# Patient Record
Sex: FEMALE | Race: WHITE | NOT HISPANIC OR LATINO | Employment: UNEMPLOYED | URBAN - METROPOLITAN AREA
[De-identification: names, ages, dates, MRNs, and addresses within clinical notes are randomized per-mention and may not be internally consistent; named-entity substitution may affect disease eponyms.]

---

## 2023-08-15 DIAGNOSIS — E78.5 HYPERLIPIDEMIA, UNSPECIFIED HYPERLIPIDEMIA TYPE: Primary | ICD-10-CM

## 2023-08-15 RX ORDER — EZETIMIBE 10 MG/1
TABLET ORAL
COMMUNITY
Start: 2023-04-11 | End: 2023-08-15 | Stop reason: SDUPTHER

## 2023-08-15 RX ORDER — ASCORBIC ACID 1000 MG
TABLET, EXTENDED RELEASE ORAL EVERY 24 HOURS
COMMUNITY

## 2023-08-15 RX ORDER — EZETIMIBE 10 MG/1
10 TABLET ORAL ONCE
Qty: 90 TABLET | Refills: 1 | Status: SHIPPED | OUTPATIENT
Start: 2023-08-15 | End: 2023-08-17 | Stop reason: SDUPTHER

## 2023-08-15 RX ORDER — HYDROGEN PEROXIDE 3 %
1 SOLUTION, NON-ORAL MISCELLANEOUS
COMMUNITY
Start: 2022-02-28 | End: 2023-09-28

## 2023-08-15 RX ORDER — VIT C/E/ZN/COPPR/LUTEIN/ZEAXAN 250MG-90MG
1 CAPSULE ORAL EVERY 24 HOURS
COMMUNITY

## 2023-08-17 DIAGNOSIS — E78.5 HYPERLIPIDEMIA, UNSPECIFIED HYPERLIPIDEMIA TYPE: ICD-10-CM

## 2023-08-17 RX ORDER — EZETIMIBE 10 MG/1
10 TABLET ORAL ONCE
Qty: 90 TABLET | Refills: 1 | Status: SHIPPED | OUTPATIENT
Start: 2023-08-17 | End: 2023-09-28 | Stop reason: SDUPTHER

## 2023-09-01 LAB
ALANINE AMINOTRANSFERASE (SGPT) (U/L) IN SER/PLAS: 17 U/L (ref 7–45)
ALBUMIN (G/DL) IN SER/PLAS: 4.5 G/DL (ref 3.4–5)
ALKALINE PHOSPHATASE (U/L) IN SER/PLAS: 88 U/L (ref 33–136)
ANION GAP IN SER/PLAS: 14 MMOL/L (ref 10–20)
ASPARTATE AMINOTRANSFERASE (SGOT) (U/L) IN SER/PLAS: 18 U/L (ref 9–39)
BILIRUBIN TOTAL (MG/DL) IN SER/PLAS: 0.7 MG/DL (ref 0–1.2)
CALCIUM (MG/DL) IN SER/PLAS: 9.9 MG/DL (ref 8.6–10.6)
CARBON DIOXIDE, TOTAL (MMOL/L) IN SER/PLAS: 29 MMOL/L (ref 21–32)
CHLORIDE (MMOL/L) IN SER/PLAS: 106 MMOL/L (ref 98–107)
CHOLESTEROL (MG/DL) IN SER/PLAS: 188 MG/DL (ref 0–199)
CHOLESTEROL IN HDL (MG/DL) IN SER/PLAS: 59.9 MG/DL
CHOLESTEROL/HDL RATIO: 3.1
CREATININE (MG/DL) IN SER/PLAS: 0.83 MG/DL (ref 0.5–1.05)
ERYTHROCYTE DISTRIBUTION WIDTH (RATIO) BY AUTOMATED COUNT: 13.6 % (ref 11.5–14.5)
ERYTHROCYTE MEAN CORPUSCULAR HEMOGLOBIN CONCENTRATION (G/DL) BY AUTOMATED: 33.6 G/DL (ref 32–36)
ERYTHROCYTE MEAN CORPUSCULAR VOLUME (FL) BY AUTOMATED COUNT: 88 FL (ref 80–100)
ERYTHROCYTES (10*6/UL) IN BLOOD BY AUTOMATED COUNT: 5.09 X10E12/L (ref 4–5.2)
GFR FEMALE: 76 ML/MIN/1.73M2
GLUCOSE (MG/DL) IN SER/PLAS: 76 MG/DL (ref 74–99)
HEMATOCRIT (%) IN BLOOD BY AUTOMATED COUNT: 44.9 % (ref 36–46)
HEMOGLOBIN (G/DL) IN BLOOD: 15.1 G/DL (ref 12–16)
LDL: 114 MG/DL (ref 0–99)
LEUKOCYTES (10*3/UL) IN BLOOD BY AUTOMATED COUNT: 5.5 X10E9/L (ref 4.4–11.3)
NRBC (PER 100 WBCS) BY AUTOMATED COUNT: 0 /100 WBC (ref 0–0)
PLATELETS (10*3/UL) IN BLOOD AUTOMATED COUNT: 229 X10E9/L (ref 150–450)
POTASSIUM (MMOL/L) IN SER/PLAS: 4.9 MMOL/L (ref 3.5–5.3)
PROTEIN TOTAL: 6.8 G/DL (ref 6.4–8.2)
SODIUM (MMOL/L) IN SER/PLAS: 144 MMOL/L (ref 136–145)
TRIGLYCERIDE (MG/DL) IN SER/PLAS: 70 MG/DL (ref 0–149)
UREA NITROGEN (MG/DL) IN SER/PLAS: 17 MG/DL (ref 6–23)
VLDL: 14 MG/DL (ref 0–40)

## 2023-09-02 LAB — THYROTROPIN (MIU/L) IN SER/PLAS BY DETECTION LIMIT <= 0.05 MIU/L: 1.58 MIU/L (ref 0.44–3.98)

## 2023-09-05 ENCOUNTER — TELEPHONE (OUTPATIENT)
Dept: PRIMARY CARE | Facility: CLINIC | Age: 68
End: 2023-09-05
Payer: COMMERCIAL

## 2023-09-28 ENCOUNTER — OFFICE VISIT (OUTPATIENT)
Dept: PRIMARY CARE | Facility: CLINIC | Age: 68
End: 2023-09-28
Payer: COMMERCIAL

## 2023-09-28 VITALS
BODY MASS INDEX: 23.9 KG/M2 | WEIGHT: 140 LBS | RESPIRATION RATE: 16 BRPM | DIASTOLIC BLOOD PRESSURE: 70 MMHG | HEART RATE: 75 BPM | SYSTOLIC BLOOD PRESSURE: 120 MMHG | OXYGEN SATURATION: 98 % | HEIGHT: 64 IN

## 2023-09-28 DIAGNOSIS — Z13.6 SCREENING FOR CARDIOVASCULAR CONDITION: ICD-10-CM

## 2023-09-28 DIAGNOSIS — E03.9 HYPOTHYROIDISM, UNSPECIFIED TYPE: ICD-10-CM

## 2023-09-28 DIAGNOSIS — Z00.00 HEALTHCARE MAINTENANCE: Primary | ICD-10-CM

## 2023-09-28 DIAGNOSIS — K21.9 GERD WITHOUT ESOPHAGITIS: ICD-10-CM

## 2023-09-28 DIAGNOSIS — R74.01 ELEVATED TRANSAMINASE LEVEL: ICD-10-CM

## 2023-09-28 DIAGNOSIS — Z13.21 ENCOUNTER FOR VITAMIN DEFICIENCY SCREENING: ICD-10-CM

## 2023-09-28 DIAGNOSIS — N60.19 FIBROCYSTIC BREAST CHANGES, UNSPECIFIED LATERALITY: ICD-10-CM

## 2023-09-28 DIAGNOSIS — E78.5 HYPERLIPIDEMIA, UNSPECIFIED HYPERLIPIDEMIA TYPE: ICD-10-CM

## 2023-09-28 DIAGNOSIS — M85.80 OSTEOPENIA, UNSPECIFIED LOCATION: ICD-10-CM

## 2023-09-28 PROBLEM — N95.2 POSTMENOPAUSAL ATROPHIC VAGINITIS: Status: ACTIVE | Noted: 2023-09-28

## 2023-09-28 PROBLEM — N95.1 MENOPAUSAL AND FEMALE CLIMACTERIC STATES: Status: ACTIVE | Noted: 2023-09-28

## 2023-09-28 PROCEDURE — 1036F TOBACCO NON-USER: CPT | Performed by: INTERNAL MEDICINE

## 2023-09-28 PROCEDURE — 1159F MED LIST DOCD IN RCRD: CPT | Performed by: INTERNAL MEDICINE

## 2023-09-28 PROCEDURE — 99397 PER PM REEVAL EST PAT 65+ YR: CPT | Performed by: INTERNAL MEDICINE

## 2023-09-28 PROCEDURE — 1160F RVW MEDS BY RX/DR IN RCRD: CPT | Performed by: INTERNAL MEDICINE

## 2023-09-28 PROCEDURE — 1126F AMNT PAIN NOTED NONE PRSNT: CPT | Performed by: INTERNAL MEDICINE

## 2023-09-28 RX ORDER — EZETIMIBE 10 MG/1
10 TABLET ORAL ONCE
Qty: 1 TABLET | Refills: 0 | Status: SHIPPED | OUTPATIENT
Start: 2023-09-28 | End: 2024-05-17

## 2023-09-28 RX ORDER — MV-MN/OM3/DHA/EPA/FISH/LUT/ZEA 250-5-1 MG
CAPSULE ORAL
COMMUNITY

## 2023-09-28 RX ORDER — LACTOBACILLUS RHAMNOSUS GG 10B CELL
CAPSULE ORAL
COMMUNITY

## 2023-09-28 ASSESSMENT — PATIENT HEALTH QUESTIONNAIRE - PHQ9
1. LITTLE INTEREST OR PLEASURE IN DOING THINGS: NOT AT ALL
SUM OF ALL RESPONSES TO PHQ9 QUESTIONS 1 AND 2: 0
2. FEELING DOWN, DEPRESSED OR HOPELESS: NOT AT ALL

## 2023-09-28 ASSESSMENT — ENCOUNTER SYMPTOMS
CONSTITUTIONAL NEGATIVE: 1
DEPRESSION: 0

## 2023-09-28 NOTE — PROGRESS NOTES
"  Subjective   Patient ID: Kimberly Meléndez is a 68 y.o. female who presents for Follow-up (Fuv on bw).  HPI    Fu lipids  liver .  Rev  labs.  Hld  nafld.   Mamm dexa utd.   Neg  cologuard.     Checks   bp at home  `120/ 70     Below is the patient's most recent value for Albumin, ALT, AST, BUN, Calcium, Chloride, Cholesterol, CO2, Creatinine, GFR, Glucose, HDL, Hematocrit, Hemoglobin, Hemoglobin A1C, LDL, Magnesium, Phosphorus, Platelets, Potassium, PSA, Sodium, Triglycerides, and WBC.   Lab Results   Component Value Date    ALBUMIN 4.5 09/01/2023    ALT 17 09/01/2023    AST 18 09/01/2023    BUN 17 09/01/2023    CALCIUM 9.9 09/01/2023     09/01/2023    CHOL 188 09/01/2023    CO2 29 09/01/2023    CREATININE 0.83 09/01/2023    HDL 59.9 09/01/2023    HCT 44.9 09/01/2023    HGB 15.1 09/01/2023     09/01/2023    K 4.9 09/01/2023     09/01/2023    TRIG 70 09/01/2023    WBC 5.5 09/01/2023     Note: for a comprehensive list of the patient's lab results, access the Results Review activity.  Review of Systems   Constitutional: Negative.    All other systems reviewed and are negative.      Objective   BP Readings from Last 3 Encounters:   09/28/23 120/70   02/03/23 141/77   07/07/22 112/80      Wt Readings from Last 3 Encounters:   09/28/23 63.5 kg (140 lb)   02/03/23 63.5 kg (140 lb)   07/07/22 66.7 kg (147 lb)      BMI: Estimated body mass index is 24.03 kg/m² as calculated from the following:    Height as of this encounter: 1.626 m (5' 4\").    Weight as of this encounter: 63.5 kg (140 lb).    BSA: Estimated body surface area is 1.69 meters squared as calculated from the following:    Height as of this encounter: 1.626 m (5' 4\").    Weight as of this encounter: 63.5 kg (140 lb).  Physical Exam  Vitals and nursing note reviewed.   Constitutional:       Appearance: Normal appearance.   HENT:      Head: Normocephalic and atraumatic.      Right Ear: External ear normal.      Left Ear: External ear normal.     "  Nose: Nose normal.   Eyes:      Conjunctiva/sclera: Conjunctivae normal.   Cardiovascular:      Rate and Rhythm: Normal rate and regular rhythm.      Heart sounds: Normal heart sounds.   Pulmonary:      Effort: Pulmonary effort is normal.      Breath sounds: Normal breath sounds.   Skin:     General: Skin is warm and dry.   Neurological:      General: No focal deficit present.      Mental Status: She is alert and oriented to person, place, and time. Mental status is at baseline.   Psychiatric:         Mood and Affect: Mood normal.         Behavior: Behavior normal.         Assessment/Plan   Problem List Items Addressed This Visit             ICD-10-CM       Medium    Elevated transaminase level R74.01    Fibrocystic breast changes N60.19    GERD without esophagitis K21.9    HLD (hyperlipidemia) E78.5    Relevant Medications    ezetimibe (Zetia) 10 mg tablet    Other Relevant Orders    Follow Up In Primary Care    CBC    Comprehensive Metabolic Panel    Lipid Panel    Thyroxine, Free    Vitamin D 1,25 Dihydroxy (for eval of hypercalcemia)    Osteopenia M85.80     Other Visit Diagnoses         Codes    Healthcare maintenance    -  Primary Z00.00    Relevant Orders    CBC    Comprehensive Metabolic Panel    Lipid Panel    Thyroxine, Free    Vitamin D 1,25 Dihydroxy (for eval of hypercalcemia)    Screening for cardiovascular condition     Z13.6    Relevant Orders    Lipid Panel    Hypothyroidism, unspecified type     E03.9    Encounter for vitamin deficiency screening     Z13.21    Relevant Orders    Vitamin D 1,25 Dihydroxy (for eval of hypercalcemia)

## 2024-05-17 DIAGNOSIS — E78.5 HYPERLIPIDEMIA, UNSPECIFIED HYPERLIPIDEMIA TYPE: ICD-10-CM

## 2024-05-17 RX ORDER — EZETIMIBE 10 MG/1
10 TABLET ORAL DAILY
Qty: 90 TABLET | Refills: 0 | Status: SHIPPED | OUTPATIENT
Start: 2024-05-17

## 2024-07-03 ENCOUNTER — APPOINTMENT (OUTPATIENT)
Dept: PRIMARY CARE | Facility: CLINIC | Age: 69
End: 2024-07-03
Payer: COMMERCIAL

## 2024-07-03 VITALS
WEIGHT: 140 LBS | BODY MASS INDEX: 24.8 KG/M2 | SYSTOLIC BLOOD PRESSURE: 120 MMHG | HEIGHT: 63 IN | DIASTOLIC BLOOD PRESSURE: 68 MMHG

## 2024-07-03 DIAGNOSIS — Z12.31 VISIT FOR SCREENING MAMMOGRAM: Primary | ICD-10-CM

## 2024-07-03 DIAGNOSIS — M81.0 AGE RELATED OSTEOPOROSIS, UNSPECIFIED PATHOLOGICAL FRACTURE PRESENCE: ICD-10-CM

## 2024-07-03 DIAGNOSIS — K21.9 GERD WITHOUT ESOPHAGITIS: ICD-10-CM

## 2024-07-03 DIAGNOSIS — E03.9 HYPOTHYROIDISM, UNSPECIFIED TYPE: ICD-10-CM

## 2024-07-03 DIAGNOSIS — Z00.00 HEALTHCARE MAINTENANCE: ICD-10-CM

## 2024-07-03 DIAGNOSIS — E78.5 HYPERLIPIDEMIA, UNSPECIFIED HYPERLIPIDEMIA TYPE: ICD-10-CM

## 2024-07-03 PROBLEM — H04.19 DISORDER OF LACRIMAL GLAND: Status: ACTIVE | Noted: 2024-07-03

## 2024-07-03 PROBLEM — R92.8 ABNORMAL MAMMOGRAM: Status: ACTIVE | Noted: 2024-07-03

## 2024-07-03 PROBLEM — K62.7 RADIATION PROCTITIS: Status: ACTIVE | Noted: 2024-07-03

## 2024-07-03 PROBLEM — E66.3 OVERWEIGHT WITH BODY MASS INDEX (BMI) 25.0-29.9: Status: ACTIVE | Noted: 2024-07-03

## 2024-07-03 PROBLEM — H52.7 DISORDER OF REFRACTION: Status: ACTIVE | Noted: 2024-07-03

## 2024-07-03 PROBLEM — H25.13 AGE-RELATED NUCLEAR CATARACT OF BOTH EYES: Status: ACTIVE | Noted: 2024-07-03

## 2024-07-03 PROBLEM — R09.81 CONGESTION OF PARANASAL SINUS: Status: ACTIVE | Noted: 2024-07-03

## 2024-07-03 PROBLEM — Z85.42 HISTORY OF UTERINE CANCER: Status: ACTIVE | Noted: 2024-07-03

## 2024-07-03 PROBLEM — C54.0: Status: ACTIVE | Noted: 2024-07-03

## 2024-07-03 PROCEDURE — 93000 ELECTROCARDIOGRAM COMPLETE: CPT | Performed by: INTERNAL MEDICINE

## 2024-07-03 PROCEDURE — 3008F BODY MASS INDEX DOCD: CPT | Performed by: INTERNAL MEDICINE

## 2024-07-03 PROCEDURE — 1159F MED LIST DOCD IN RCRD: CPT | Performed by: INTERNAL MEDICINE

## 2024-07-03 PROCEDURE — 99397 PER PM REEVAL EST PAT 65+ YR: CPT | Performed by: INTERNAL MEDICINE

## 2024-07-03 RX ORDER — PANTOPRAZOLE SODIUM 40 MG/1
40 TABLET, DELAYED RELEASE ORAL DAILY
Qty: 30 TABLET | Refills: 0 | Status: SHIPPED | OUTPATIENT
Start: 2024-07-03 | End: 2024-09-01

## 2024-07-03 NOTE — PROGRESS NOTES
"Subjective   Patient ID: Kimberly Meléndez is a 69 y.o. female who presents for Establish Care.    HPI   To establish PCP  Dr. Cleaning's patient  For physical  Allergies causing stuffiness dripping in the throat constantly clearing the throat    Past medical history: Hysterectomy, uterine cancer, high cholesterol s/p radiation treatment  Social history: Non-smoker social drinker on weekends quit smoking 30 years ago  Occupation:   Family history: Grandmother coronary artery disease mother lived to be   Review of Systems    Objective   /68   Ht 1.6 m (5' 3\")   Wt 63.5 kg (140 lb)   BMI 24.80 kg/m²     Physical Exam  Vitals reviewed.   Constitutional:       Appearance: Normal appearance.   HENT:      Head: Normocephalic and atraumatic.      Right Ear: Tympanic membrane, ear canal and external ear normal.      Left Ear: Tympanic membrane, ear canal and external ear normal.      Nose: Nose normal.      Mouth/Throat:      Pharynx: Oropharynx is clear.   Eyes:      Extraocular Movements: Extraocular movements intact.      Conjunctiva/sclera: Conjunctivae normal.      Pupils: Pupils are equal, round, and reactive to light.   Cardiovascular:      Rate and Rhythm: Normal rate and regular rhythm.      Pulses: Normal pulses.      Heart sounds: Normal heart sounds.   Pulmonary:      Effort: Pulmonary effort is normal.      Breath sounds: Normal breath sounds.   Abdominal:      General: Abdomen is flat. Bowel sounds are normal.      Palpations: Abdomen is soft.   Musculoskeletal:      Cervical back: Normal range of motion and neck supple.   Skin:     General: Skin is warm and dry.   Neurological:      General: No focal deficit present.      Mental Status: She is alert and oriented to person, place, and time.   Psychiatric:         Mood and Affect: Mood normal.         Assessment/Plan   Problem List Items Addressed This Visit             ICD-10-CM       Cardiac and Vasculature    HLD " (hyperlipidemia) E78.5    Relevant Orders    CBC    Comprehensive Metabolic Panel    Thyroid Stimulating Hormone    Lipid Panel    Vitamin D 25-Hydroxy,Total (for eval of Vitamin D levels)       Gastrointestinal and Abdominal    GERD without esophagitis K21.9    Relevant Medications    pantoprazole (ProtoNix) 40 mg EC tablet     Other Visit Diagnoses         Codes    Visit for screening mammogram    -  Primary Z12.31    Relevant Orders    BI mammo bilateral screening tomosynthesis    Healthcare maintenance     Z00.00    Relevant Orders    ECG 12 Lead    CT cardiac scoring wo IV contrast    Hypothyroidism, unspecified type     E03.9    Relevant Orders    CBC    Comprehensive Metabolic Panel    Thyroid Stimulating Hormone    Lipid Panel    Vitamin D 25-Hydroxy,Total (for eval of Vitamin D levels)    Age related osteoporosis, unspecified pathological fracture presence     M81.0    Relevant Orders    XR DEXA bone density (Completed)        Physical  CBC CMP fasting with TSH vitamin D ordered  EKG done shows normal sinus rhythm  Mammogram ordered  Bone density ordered  CT cardiac scoring ordered  Allergies could be causing clearing of the throat constantly but acid reflux could be contributing to  Protonix prescription  Follow-up after workup

## 2024-07-09 ASSESSMENT — LIFESTYLE VARIABLES
3_OR_MORE_DRINKS_PER_DAY: N
CURRENT_SMOKER: N

## 2024-07-12 ENCOUNTER — HOSPITAL ENCOUNTER (OUTPATIENT)
Dept: RADIOLOGY | Facility: CLINIC | Age: 69
Discharge: HOME | End: 2024-07-12
Payer: COMMERCIAL

## 2024-07-12 DIAGNOSIS — M81.0 AGE RELATED OSTEOPOROSIS, UNSPECIFIED PATHOLOGICAL FRACTURE PRESENCE: ICD-10-CM

## 2024-07-17 ENCOUNTER — HOSPITAL ENCOUNTER (OUTPATIENT)
Dept: RADIOLOGY | Facility: CLINIC | Age: 69
Discharge: HOME | End: 2024-07-17
Payer: COMMERCIAL

## 2024-07-17 PROCEDURE — 77080 DXA BONE DENSITY AXIAL: CPT

## 2024-07-17 ASSESSMENT — LIFESTYLE VARIABLES
CURRENT_SMOKER: N
3_OR_MORE_DRINKS_PER_DAY: N

## 2024-07-18 ENCOUNTER — OFFICE VISIT (OUTPATIENT)
Dept: OPHTHALMOLOGY | Facility: CLINIC | Age: 69
End: 2024-07-18
Payer: COMMERCIAL

## 2024-07-18 ENCOUNTER — CLINICAL SUPPORT (OUTPATIENT)
Dept: OPHTHALMOLOGY | Facility: CLINIC | Age: 69
End: 2024-07-18
Payer: COMMERCIAL

## 2024-07-18 ENCOUNTER — APPOINTMENT (OUTPATIENT)
Dept: OPHTHALMOLOGY | Facility: CLINIC | Age: 69
End: 2024-07-18
Payer: COMMERCIAL

## 2024-07-18 DIAGNOSIS — H52.7 DISORDER OF REFRACTION: ICD-10-CM

## 2024-07-18 DIAGNOSIS — H16.223 KERATOCONJUNCTIVITIS SICCA OF BOTH EYES NOT DUE TO SJOGREN'S SYNDROME: ICD-10-CM

## 2024-07-18 DIAGNOSIS — H25.13 AGE-RELATED NUCLEAR CATARACT OF BOTH EYES: Primary | ICD-10-CM

## 2024-07-18 PROCEDURE — 99213 OFFICE O/P EST LOW 20 MIN: CPT | Performed by: OPHTHALMOLOGY

## 2024-07-18 PROCEDURE — 92015 DETERMINE REFRACTIVE STATE: CPT | Performed by: OPHTHALMOLOGY

## 2024-07-18 ASSESSMENT — KERATOMETRY
OS_K1POWER_DIOPTERS: 43.00
OD_AXISANGLE2_DEGREES: 25
OD_K2POWER_DIOPTERS: 43.75
METHOD_AUTO_MANUAL: AUTOMATED
OS_AXISANGLE_DEGREES: 75
OD_K1POWER_DIOPTERS: 43.25
OD_AXISANGLE_DEGREES: 115
OS_K2POWER_DIOPTERS: 43.75
OS_AXISANGLE2_DEGREES: 165

## 2024-07-18 ASSESSMENT — ENCOUNTER SYMPTOMS
ALLERGIC/IMMUNOLOGIC NEGATIVE: 0
MUSCULOSKELETAL NEGATIVE: 0
CONSTITUTIONAL NEGATIVE: 0
EYES NEGATIVE: 0
ENDOCRINE NEGATIVE: 0
GASTROINTESTINAL NEGATIVE: 0
PSYCHIATRIC NEGATIVE: 0
RESPIRATORY NEGATIVE: 0
NEUROLOGICAL NEGATIVE: 0
CARDIOVASCULAR NEGATIVE: 0
HEMATOLOGIC/LYMPHATIC NEGATIVE: 0

## 2024-07-18 ASSESSMENT — TONOMETRY
IOP_METHOD: GOLDMANN APPLANATION
OD_IOP_MMHG: 14
OS_IOP_MMHG: 14

## 2024-07-18 ASSESSMENT — VISUAL ACUITY
OS_CC+: -1
METHOD: SNELLEN - SINGLE
OS_CC: 20/30
OD_CC+: -1
OD_CC: 20/30

## 2024-07-18 ASSESSMENT — REFRACTION_MANIFEST
OS_SPHERE: +0.50
METHOD_AUTOREFRACTION: 1
OD_SPHERE: +0.50
OS_AXIS: 110
OD_CYLINDER: -1.00
OD_AXIS: 065
OS_CYLINDER: -1.00

## 2024-07-18 ASSESSMENT — SLIT LAMP EXAM - LIDS
COMMENTS: NORMAL
COMMENTS: NORMAL

## 2024-07-18 ASSESSMENT — REFRACTION_WEARINGRX
OD_SPHERE: +1.75
SPECS_TYPE: OTC READERS
OS_SPHERE: +1.75

## 2024-07-18 ASSESSMENT — PAIN SCALES - GENERAL: PAINLEVEL: 0-NO PAIN

## 2024-07-18 ASSESSMENT — CUP TO DISC RATIO
OS_RATIO: 0.55
OD_RATIO: 0.55

## 2024-07-18 ASSESSMENT — EXTERNAL EXAM - LEFT EYE: OS_EXAM: NORMAL

## 2024-07-18 ASSESSMENT — EXTERNAL EXAM - RIGHT EYE: OD_EXAM: NORMAL

## 2024-07-18 NOTE — PATIENT INSTRUCTIONS
Thank you so much for choosing me to provide your care today!    If you were dilated your vision may remain blurry   or light sensitive for several hours.    The nature of eye and vision problems can require frequent follow up, please make every effort to adhere to any future appointments.    If you have any issues, questions, or concerns,   please do not hesitate to reach out.    If you receive a survey in regards to your care today, please mention any exceptional care my office staff and/or technicians provided.    You can reach our office at this number:  [phone]    Home

## 2024-07-18 NOTE — PROGRESS NOTES
Assessment/Plan   Problem List Items Addressed This Visit       Age-related nuclear cataract of both eyes - Primary     Non significant cataract noted on exam. Will plan to continue to monitor with serial exam.            Disorder of refraction     Discussed glasses prescription from refraction. Will provide if patient interested in keeping for records or to fill as a new set of glasses.            Keratoconjunctivitis sicca of both eyes not due to Sjogren's syndrome     Advised to continue best lubrication as needed            Provided reassurance regarding above diagnoses and care received in the office visit today. Discussed outcomes and options along with the importance of treatment compliance. Understands the importance of any follow up visits. Patient instructed to call/communicate with our office if any new issues, questions, or concerns.     Will plan to see back in 1 year full or sooner PRN

## 2024-07-20 ENCOUNTER — HOSPITAL ENCOUNTER (OUTPATIENT)
Dept: RADIOLOGY | Facility: CLINIC | Age: 69
Discharge: HOME | End: 2024-07-20
Payer: COMMERCIAL

## 2024-07-20 VITALS — WEIGHT: 140 LBS | HEIGHT: 63 IN | BODY MASS INDEX: 24.8 KG/M2

## 2024-07-20 DIAGNOSIS — Z12.31 VISIT FOR SCREENING MAMMOGRAM: ICD-10-CM

## 2024-07-20 PROCEDURE — 77067 SCR MAMMO BI INCL CAD: CPT

## 2024-07-20 PROCEDURE — 77067 SCR MAMMO BI INCL CAD: CPT | Performed by: RADIOLOGY

## 2024-07-20 PROCEDURE — 77063 BREAST TOMOSYNTHESIS BI: CPT | Performed by: RADIOLOGY

## 2024-07-29 ENCOUNTER — APPOINTMENT (OUTPATIENT)
Dept: PRIMARY CARE | Facility: CLINIC | Age: 69
End: 2024-07-29
Payer: COMMERCIAL

## 2024-08-20 DIAGNOSIS — E78.5 HYPERLIPIDEMIA, UNSPECIFIED HYPERLIPIDEMIA TYPE: ICD-10-CM

## 2024-08-20 RX ORDER — EZETIMIBE 10 MG/1
10 TABLET ORAL DAILY
Qty: 90 TABLET | Refills: 0 | Status: SHIPPED | OUTPATIENT
Start: 2024-08-20

## 2024-09-16 ENCOUNTER — LAB (OUTPATIENT)
Dept: LAB | Facility: LAB | Age: 69
End: 2024-09-16
Payer: COMMERCIAL

## 2024-09-16 DIAGNOSIS — E78.5 HYPERLIPIDEMIA, UNSPECIFIED HYPERLIPIDEMIA TYPE: ICD-10-CM

## 2024-09-16 DIAGNOSIS — E03.9 HYPOTHYROIDISM, UNSPECIFIED TYPE: ICD-10-CM

## 2024-09-16 LAB
25(OH)D3 SERPL-MCNC: 54 NG/ML (ref 30–100)
ALBUMIN SERPL BCP-MCNC: 4.6 G/DL (ref 3.4–5)
ALP SERPL-CCNC: 101 U/L (ref 33–136)
ALT SERPL W P-5'-P-CCNC: 12 U/L (ref 7–45)
ANION GAP SERPL CALC-SCNC: 15 MMOL/L (ref 10–20)
AST SERPL W P-5'-P-CCNC: 16 U/L (ref 9–39)
BILIRUB SERPL-MCNC: 0.8 MG/DL (ref 0–1.2)
BUN SERPL-MCNC: 17 MG/DL (ref 6–23)
CALCIUM SERPL-MCNC: 10 MG/DL (ref 8.6–10.6)
CHLORIDE SERPL-SCNC: 103 MMOL/L (ref 98–107)
CHOLEST SERPL-MCNC: 194 MG/DL (ref 0–199)
CHOLESTEROL/HDL RATIO: 3.1
CO2 SERPL-SCNC: 29 MMOL/L (ref 21–32)
CREAT SERPL-MCNC: 0.81 MG/DL (ref 0.5–1.05)
EGFRCR SERPLBLD CKD-EPI 2021: 78 ML/MIN/1.73M*2
ERYTHROCYTE [DISTWIDTH] IN BLOOD BY AUTOMATED COUNT: 13.6 % (ref 11.5–14.5)
GLUCOSE SERPL-MCNC: 90 MG/DL (ref 74–99)
HCT VFR BLD AUTO: 46.1 % (ref 36–46)
HDLC SERPL-MCNC: 61.7 MG/DL
HGB BLD-MCNC: 15.1 G/DL (ref 12–16)
LDLC SERPL CALC-MCNC: 111 MG/DL
MCH RBC QN AUTO: 28.4 PG (ref 26–34)
MCHC RBC AUTO-ENTMCNC: 32.8 G/DL (ref 32–36)
MCV RBC AUTO: 87 FL (ref 80–100)
NON HDL CHOLESTEROL: 132 MG/DL (ref 0–149)
NRBC BLD-RTO: 0 /100 WBCS (ref 0–0)
PLATELET # BLD AUTO: 265 X10*3/UL (ref 150–450)
POTASSIUM SERPL-SCNC: 5.2 MMOL/L (ref 3.5–5.3)
PROT SERPL-MCNC: 7.2 G/DL (ref 6.4–8.2)
RBC # BLD AUTO: 5.31 X10*6/UL (ref 4–5.2)
SODIUM SERPL-SCNC: 142 MMOL/L (ref 136–145)
TRIGL SERPL-MCNC: 107 MG/DL (ref 0–149)
TSH SERPL-ACNC: 1.47 MIU/L (ref 0.44–3.98)
VLDL: 21 MG/DL (ref 0–40)
WBC # BLD AUTO: 5.4 X10*3/UL (ref 4.4–11.3)

## 2024-09-16 PROCEDURE — 84443 ASSAY THYROID STIM HORMONE: CPT

## 2024-09-16 PROCEDURE — 80061 LIPID PANEL: CPT

## 2024-09-16 PROCEDURE — 82306 VITAMIN D 25 HYDROXY: CPT

## 2024-09-16 PROCEDURE — 36415 COLL VENOUS BLD VENIPUNCTURE: CPT

## 2024-09-16 PROCEDURE — 85027 COMPLETE CBC AUTOMATED: CPT

## 2024-09-16 PROCEDURE — 80053 COMPREHEN METABOLIC PANEL: CPT

## 2024-10-15 ENCOUNTER — HOSPITAL ENCOUNTER (OUTPATIENT)
Dept: RADIOLOGY | Facility: CLINIC | Age: 69
Discharge: HOME | End: 2024-10-15
Payer: COMMERCIAL

## 2024-10-15 DIAGNOSIS — Z00.00 HEALTHCARE MAINTENANCE: ICD-10-CM

## 2024-10-15 PROCEDURE — 75571 CT HRT W/O DYE W/CA TEST: CPT

## 2024-11-21 DIAGNOSIS — E78.5 HYPERLIPIDEMIA, UNSPECIFIED HYPERLIPIDEMIA TYPE: ICD-10-CM

## 2024-11-21 RX ORDER — EZETIMIBE 10 MG/1
10 TABLET ORAL DAILY
Qty: 90 TABLET | Refills: 0 | Status: SHIPPED | OUTPATIENT
Start: 2024-11-21

## 2025-02-11 ENCOUNTER — OFFICE VISIT (OUTPATIENT)
Dept: PRIMARY CARE | Facility: EXTERNAL LOCATION | Age: 70
End: 2025-02-11
Payer: COMMERCIAL

## 2025-02-11 VITALS
SYSTOLIC BLOOD PRESSURE: 138 MMHG | BODY MASS INDEX: 24.8 KG/M2 | TEMPERATURE: 98.6 F | DIASTOLIC BLOOD PRESSURE: 84 MMHG | OXYGEN SATURATION: 97 % | WEIGHT: 140 LBS | HEART RATE: 74 BPM

## 2025-02-11 DIAGNOSIS — J06.9 VIRAL UPPER RESPIRATORY TRACT INFECTION: Primary | ICD-10-CM

## 2025-02-11 ASSESSMENT — ENCOUNTER SYMPTOMS
NAUSEA: 0
SORE THROAT: 0
SINUS PAIN: 0
APPETITE CHANGE: 0
SHORTNESS OF BREATH: 0
COUGH: 1
EYE PAIN: 0
VOICE CHANGE: 0
FATIGUE: 0
WHEEZING: 0
EYE REDNESS: 0
SINUS PRESSURE: 1
ACTIVITY CHANGE: 0
CHILLS: 0
FEVER: 0
PSYCHIATRIC NEGATIVE: 1
RHINORRHEA: 1
DIAPHORESIS: 0
HEADACHES: 1
DIARRHEA: 0
MYALGIAS: 0
VOMITING: 0
EYE ITCHING: 0

## 2025-02-11 NOTE — PROGRESS NOTES
Subjective   Patient ID: Kimberly Meléndez is a 69 y.o. female who presents for Cough and Nasal Congestion (Approx 1 week).    Pt presents with c/o sinus congestion x1 week. White mucus with cough. Works in school.     URI   This is a new problem. The current episode started in the past 7 days. There has been no fever. Associated symptoms include congestion, coughing, headaches, a plugged ear sensation and rhinorrhea. Pertinent negatives include no chest pain, diarrhea, ear pain, nausea, sinus pain, sneezing, sore throat, vomiting or wheezing. Treatments tried: nyquil, dayquil. The treatment provided mild relief.        Review of Systems   Constitutional:  Negative for activity change, appetite change, chills, diaphoresis, fatigue and fever.   HENT:  Positive for congestion, postnasal drip, rhinorrhea and sinus pressure. Negative for ear pain, sinus pain, sneezing, sore throat and voice change.    Eyes:  Negative for pain, redness and itching.   Respiratory:  Positive for cough. Negative for shortness of breath and wheezing.    Cardiovascular:  Negative for chest pain.   Gastrointestinal:  Negative for diarrhea, nausea and vomiting.   Genitourinary:  Negative for decreased urine volume.   Musculoskeletal:  Negative for myalgias.   Neurological:  Positive for headaches.   Psychiatric/Behavioral: Negative.         Objective   /84   Pulse 74   Temp 37 °C (98.6 °F) (Oral)   Wt 63.5 kg (140 lb)   SpO2 97%   BMI 24.80 kg/m²     Physical Exam  Vitals and nursing note reviewed.   Constitutional:       General: She is not in acute distress.     Appearance: Normal appearance.   HENT:      Head: Normocephalic.      Right Ear: Tympanic membrane, ear canal and external ear normal. There is no impacted cerumen.      Left Ear: Tympanic membrane, ear canal and external ear normal. There is no impacted cerumen.      Nose: Rhinorrhea present. No congestion.      Mouth/Throat:      Mouth: Mucous membranes are moist.       Pharynx: Oropharynx is clear. No oropharyngeal exudate or posterior oropharyngeal erythema.   Eyes:      Conjunctiva/sclera: Conjunctivae normal.   Cardiovascular:      Rate and Rhythm: Normal rate and regular rhythm.      Heart sounds: No murmur heard.  Pulmonary:      Effort: Pulmonary effort is normal. No respiratory distress.      Breath sounds: Normal breath sounds. No stridor. No wheezing, rhonchi or rales.   Lymphadenopathy:      Cervical: No cervical adenopathy.   Skin:     General: Skin is warm and dry.   Neurological:      General: No focal deficit present.      Mental Status: She is alert. Mental status is at baseline.   Psychiatric:         Mood and Affect: Mood normal.         Behavior: Behavior normal.         Thought Content: Thought content normal.         Judgment: Judgment normal.         Assessment/Plan   Diagnoses and all orders for this visit:  Viral upper respiratory tract infection  Upper respiratory infection-    Get plenty of rest and maintain hydration.    May use humidification in sleeping areas.  Use saline nasal spray to thin mucous.   May use throat lozenges, salt water gargles, or chloraseptic spray as needed to relieve sore throat.    OTC Cold Medications:  Tylenol/Ibuprofen- pain control and fever management  Guaifenesin - thins mucus - take with a full glass of water for best effect  Dextromethorphan - cough suppressant  Decongestants - dry up mucus - pseudoephedrine (stronger, but more side effects) and phenylephrine - be cautious if high blood pressure  Flonase- nasal steroid to improve nasal inflammation.    Use good hand hygiene to prevent the spread of germs.    Follow up if symptoms persist greater than 7 days.  Seek emergency medical care if fever above 104F, difficulty swallowing, or difficulty breathing occurs.

## 2025-03-04 ENCOUNTER — APPOINTMENT (OUTPATIENT)
Dept: PRIMARY CARE | Facility: CLINIC | Age: 70
End: 2025-03-04
Payer: COMMERCIAL

## 2025-03-04 VITALS
BODY MASS INDEX: 24.1 KG/M2 | SYSTOLIC BLOOD PRESSURE: 118 MMHG | WEIGHT: 136 LBS | DIASTOLIC BLOOD PRESSURE: 76 MMHG | HEIGHT: 63 IN

## 2025-03-04 DIAGNOSIS — K21.9 GASTROESOPHAGEAL REFLUX DISEASE WITHOUT ESOPHAGITIS: ICD-10-CM

## 2025-03-04 DIAGNOSIS — T78.40XS ALLERGY, SEQUELA: Primary | ICD-10-CM

## 2025-03-04 DIAGNOSIS — E78.5 HYPERLIPIDEMIA, UNSPECIFIED HYPERLIPIDEMIA TYPE: ICD-10-CM

## 2025-03-04 DIAGNOSIS — M85.80 OSTEOPENIA, UNSPECIFIED LOCATION: ICD-10-CM

## 2025-03-04 PROCEDURE — 1159F MED LIST DOCD IN RCRD: CPT | Performed by: INTERNAL MEDICINE

## 2025-03-04 PROCEDURE — 3008F BODY MASS INDEX DOCD: CPT | Performed by: INTERNAL MEDICINE

## 2025-03-04 PROCEDURE — 99214 OFFICE O/P EST MOD 30 MIN: CPT | Performed by: INTERNAL MEDICINE

## 2025-03-04 RX ORDER — EZETIMIBE 10 MG/1
10 TABLET ORAL DAILY
Qty: 90 TABLET | Refills: 1 | Status: SHIPPED | OUTPATIENT
Start: 2025-03-04

## 2025-03-04 ASSESSMENT — ENCOUNTER SYMPTOMS
LOSS OF SENSATION IN FEET: 0
DEPRESSION: 0
OCCASIONAL FEELINGS OF UNSTEADINESS: 0

## 2025-03-04 NOTE — PROGRESS NOTES
"Subjective   Patient ID: Kimberly Meléndez is a 69 y.o. female who presents for No chief complaint on file..    HPI   Patient is here for follow-up  Follow-up on high cholesterol  Needs refills on Zetia   Patient tried PPI and it did help her symptoms a lot.  Now she is changing her lifestyle and trying to do better and symptoms are improving.  She still has some allergies and postnasal drainage    Past recap   to establish PCP  Dr. Cleaning's patient  For physical  Allergies causing stuffiness dripping in the throat constantly clearing the throat    Past medical history: Hysterectomy, uterine cancer, high cholesterol s/p radiation treatment  Social history: Non-smoker social drinker on weekends quit smoking 30 years ago  Occupation:   Family history: Grandmother coronary artery disease mother lived to be   Review of Systems    Objective   /76   Ht 1.6 m (5' 3\")   Wt 61.7 kg (136 lb)   BMI 24.09 kg/m²     Physical Exam  Vitals reviewed.   Constitutional:       Appearance: Normal appearance.   HENT:      Head: Normocephalic and atraumatic.      Right Ear: Tympanic membrane, ear canal and external ear normal.      Left Ear: Tympanic membrane, ear canal and external ear normal.      Nose: Nose normal.      Mouth/Throat:      Pharynx: Oropharynx is clear.   Eyes:      Extraocular Movements: Extraocular movements intact.      Conjunctiva/sclera: Conjunctivae normal.      Pupils: Pupils are equal, round, and reactive to light.   Cardiovascular:      Rate and Rhythm: Normal rate and regular rhythm.      Pulses: Normal pulses.      Heart sounds: Normal heart sounds.   Pulmonary:      Effort: Pulmonary effort is normal.      Breath sounds: Normal breath sounds.   Abdominal:      General: Abdomen is flat. Bowel sounds are normal.      Palpations: Abdomen is soft.   Musculoskeletal:      Cervical back: Normal range of motion and neck supple.   Skin:     General: Skin is warm and dry. "   Neurological:      General: No focal deficit present.      Mental Status: She is alert and oriented to person, place, and time.   Psychiatric:         Mood and Affect: Mood normal.         Assessment/Plan   Problem List Items Addressed This Visit             ICD-10-CM       Cardiac and Vasculature    HLD (hyperlipidemia) E78.5    Relevant Medications    ezetimibe (Zetia) 10 mg tablet    Other Relevant Orders    Lipid Panel    Comprehensive Metabolic Panel       Musculoskeletal and Injuries    Osteopenia M85.80    Relevant Orders    Vitamin D 25-Hydroxy,Total (for eval of Vitamin D levels)     Other Visit Diagnoses         Codes    Allergy, sequela    -  Primary T78.40XS    Gastroesophageal reflux disease without esophagitis     K21.9          Physical  CBC CMP fasting with TSH vitamin D ordered  EKG done shows normal sinus rhythm  Mammogram ordered  Bone density ordered  CT cardiac scoring ordered  Allergies could be causing clearing of the throat constantly but acid reflux could be contributing to  Protonix prescription  Follow-up after workup    Past recap  Blood work ordered  Last blood work reviewed  Zetia refilled  Continue healthy lifestyle for GERD  For allergies discussed breathing exercises to see if that helps

## 2025-06-10 DIAGNOSIS — E78.5 HYPERLIPIDEMIA, UNSPECIFIED HYPERLIPIDEMIA TYPE: ICD-10-CM

## 2025-07-31 ENCOUNTER — APPOINTMENT (OUTPATIENT)
Dept: OPHTHALMOLOGY | Facility: CLINIC | Age: 70
End: 2025-07-31
Payer: COMMERCIAL

## 2025-07-31 DIAGNOSIS — H25.813 COMBINED FORMS OF AGE-RELATED CATARACT OF BOTH EYES: Primary | ICD-10-CM

## 2025-07-31 DIAGNOSIS — H16.223 KERATOCONJUNCTIVITIS SICCA OF BOTH EYES NOT DUE TO SJOGREN'S SYNDROME: ICD-10-CM

## 2025-07-31 DIAGNOSIS — H52.7 DISORDER OF REFRACTION: ICD-10-CM

## 2025-07-31 PROCEDURE — 99214 OFFICE O/P EST MOD 30 MIN: CPT | Performed by: OPHTHALMOLOGY

## 2025-07-31 ASSESSMENT — CUP TO DISC RATIO
OD_RATIO: 0.55
OS_RATIO: 0.55

## 2025-07-31 ASSESSMENT — ENCOUNTER SYMPTOMS
CONSTITUTIONAL NEGATIVE: 0
NEUROLOGICAL NEGATIVE: 0
RESPIRATORY NEGATIVE: 0
EYES NEGATIVE: 0
CARDIOVASCULAR NEGATIVE: 0
MUSCULOSKELETAL NEGATIVE: 0
ENDOCRINE NEGATIVE: 0
PSYCHIATRIC NEGATIVE: 0
ALLERGIC/IMMUNOLOGIC NEGATIVE: 0
GASTROINTESTINAL NEGATIVE: 0
HEMATOLOGIC/LYMPHATIC NEGATIVE: 0

## 2025-07-31 ASSESSMENT — REFRACTION_MANIFEST
OD_AXIS: 070
OS_AXIS: 100
OD_ADD: +2.50
OD_CYLINDER: -1.00
OD_AXIS: 060
OS_SPHERE: +0.25
OS_SPHERE: +1.00
OD_SPHERE: +0.25
OS_AXIS: 110
OS_ADD: +2.50
OD_CYLINDER: -1.25
METHOD_AUTOREFRACTION: 1
OD_SPHERE: +0.50
OS_CYLINDER: -1.25
OS_CYLINDER: -1.00

## 2025-07-31 ASSESSMENT — REFRACTION_WEARINGRX
SPECS_TYPE: PAL
OS_AXIS: 107
OS_SPHERE: +0.25
OD_ADD: +2.50
OD_AXIS: 072
OS_ADD: +2.50
OD_CYLINDER: -0.75
OD_SPHERE: +0.25
OS_CYLINDER: -1.00

## 2025-07-31 ASSESSMENT — KERATOMETRY
OS_AXISANGLE_DEGREES: 85
OD_AXISANGLE2_DEGREES: 20
OS_K1POWER_DIOPTERS: 43.25
OD_K1POWER_DIOPTERS: 43.00
OD_K2POWER_DIOPTERS: 43.75
OS_K2POWER_DIOPTERS: 43.50
OS_AXISANGLE2_DEGREES: 175
OD_AXISANGLE_DEGREES: 110
METHOD_AUTO_MANUAL: AUTOMATED

## 2025-07-31 ASSESSMENT — VISUAL ACUITY
METHOD: SNELLEN - SINGLE
CORRECTION_TYPE: GLASSES
OD_CC+: +1
OS_CC+: -2
OS_CC: 20/25
OD_CC: 20/25

## 2025-07-31 ASSESSMENT — PATIENT HEALTH QUESTIONNAIRE - PHQ9
SUM OF ALL RESPONSES TO PHQ9 QUESTIONS 1 AND 2: 0
1. LITTLE INTEREST OR PLEASURE IN DOING THINGS: NOT AT ALL
2. FEELING DOWN, DEPRESSED OR HOPELESS: NOT AT ALL

## 2025-07-31 ASSESSMENT — PAIN SCALES - GENERAL: PAINLEVEL_OUTOF10: 0-NO PAIN

## 2025-07-31 ASSESSMENT — EXTERNAL EXAM - RIGHT EYE: OD_EXAM: NORMAL

## 2025-07-31 ASSESSMENT — SLIT LAMP EXAM - LIDS
COMMENTS: NORMAL
COMMENTS: NORMAL

## 2025-07-31 ASSESSMENT — TONOMETRY
OD_IOP_MMHG: 16
IOP_METHOD: GOLDMANN APPLANATION
OS_IOP_MMHG: 16

## 2025-07-31 ASSESSMENT — EXTERNAL EXAM - LEFT EYE: OS_EXAM: NORMAL

## 2025-07-31 NOTE — PATIENT INSTRUCTIONS
Thank you so much for choosing me to provide your care today!    If you were dilated your vision may remain blurry   or light sensitive for several hours.    The nature of eye and vision problems can require frequent follow up, please make every effort to adhere to any future appointments.    If you have any issues, questions, or concerns,   please do not hesitate to reach out.    If you receive a survey in regards to your care today, please mention any exceptional care my office staff and/or technicians provided.    You can reach our office at this number:    [phone]    Please consider signing up for and utilizing [employer]!  This is the best way to directly reach me or other  providers

## 2025-07-31 NOTE — PROGRESS NOTES
Assessment/Plan   Problem List Items Addressed This Visit       Combined forms of age-related cataract of both eyes - Primary    Non significant cataract noted on exam. Discussed the natural course of cataract and may require surgery at some point in the future. Will plan to continue to monitor with serial exam.            Disorder of refraction    Refraction performed today for diagnostic purposes only and without specific intent to dispense Rx. Glasses/SCL Rx not dispensed today.            Keratoconjunctivitis sicca of both eyes not due to Sjogren's syndrome    Advised on role of regular lubrication for best ocular comfort and vision.               Provided reassurance regarding above diagnoses and care received in the office visit today. Discussed outcomes and options along with the importance of treatment compliance. Understands the importance of any follow up visits. Patient instructed to call/communicate with our office if any new issues, questions, or concerns.     Will plan to see back in 1 year full or sooner PRN

## 2025-09-09 ENCOUNTER — APPOINTMENT (OUTPATIENT)
Dept: RADIOLOGY | Facility: CLINIC | Age: 70
End: 2025-09-09
Payer: COMMERCIAL

## 2025-09-09 DIAGNOSIS — Z12.31 SCREENING MAMMOGRAM FOR BREAST CANCER: ICD-10-CM

## 2026-08-05 ENCOUNTER — APPOINTMENT (OUTPATIENT)
Dept: OPHTHALMOLOGY | Facility: CLINIC | Age: 71
End: 2026-08-05
Payer: COMMERCIAL